# Patient Record
Sex: FEMALE | Race: WHITE
[De-identification: names, ages, dates, MRNs, and addresses within clinical notes are randomized per-mention and may not be internally consistent; named-entity substitution may affect disease eponyms.]

---

## 2017-05-09 ENCOUNTER — HOSPITAL ENCOUNTER (EMERGENCY)
Dept: HOSPITAL 63 - ER | Age: 37
Discharge: HOME | End: 2017-05-09
Payer: SELF-PAY

## 2017-05-09 VITALS
SYSTOLIC BLOOD PRESSURE: 127 MMHG | DIASTOLIC BLOOD PRESSURE: 77 MMHG | DIASTOLIC BLOOD PRESSURE: 77 MMHG | SYSTOLIC BLOOD PRESSURE: 127 MMHG | SYSTOLIC BLOOD PRESSURE: 127 MMHG | DIASTOLIC BLOOD PRESSURE: 77 MMHG

## 2017-05-09 VITALS — BODY MASS INDEX: 33.74 KG/M2 | WEIGHT: 215 LBS | HEIGHT: 67 IN

## 2017-05-09 DIAGNOSIS — E11.9: ICD-10-CM

## 2017-05-09 DIAGNOSIS — Z91.040: ICD-10-CM

## 2017-05-09 DIAGNOSIS — N93.9: ICD-10-CM

## 2017-05-09 DIAGNOSIS — R10.84: Primary | ICD-10-CM

## 2017-05-09 DIAGNOSIS — E03.9: ICD-10-CM

## 2017-05-09 LAB
% LYMPHS: 18 % (ref 24–48)
% SEGS: 81 % (ref 35–66)
ALBUMIN SERPL-MCNC: 4.1 G/DL (ref 3.4–5)
ALP SERPL-CCNC: 24 U/L (ref 46–116)
ALT SERPL-CCNC: 25 U/L (ref 14–59)
ANION GAP SERPL CALC-SCNC: 13 MMOL/L (ref 6–14)
ANISOCYTOSIS BLD QL SMEAR: SLIGHT
APTT PPP: YELLOW S
AST SERPL-CCNC: 11 U/L (ref 15–37)
BACTERIA #/AREA URNS HPF: 0 /HPF
BASOPHILS # BLD AUTO: 0.1 X10^3/UL (ref 0–0.2)
BASOPHILS NFR BLD: 1 % (ref 0–3)
BILIRUB DIRECT SERPL-MCNC: 0.1 MG/DL (ref 0–0.2)
BILIRUB SERPL-MCNC: 0.5 MG/DL (ref 0.2–1)
BILIRUB UR QL STRIP: (no result)
CA-I SERPL ISE-MCNC: 14 MG/DL (ref 7–20)
CALCIUM SERPL-MCNC: 9.4 MG/DL (ref 8.5–10.1)
CHLORIDE SERPL-SCNC: 104 MMOL/L (ref 98–107)
CO2 SERPL-SCNC: 22 MMOL/L (ref 21–32)
CREAT SERPL-MCNC: 1 MG/DL (ref 0.6–1)
EOSINOPHIL NFR BLD AUTO: 1 % (ref 0–5)
EOSINOPHIL NFR BLD: 0.1 X10^3/UL (ref 0–0.7)
EOSINOPHIL NFR BLD: 1 % (ref 0–3)
ERYTHROCYTE [DISTWIDTH] IN BLOOD BY AUTOMATED COUNT: 16.1 % (ref 11.5–14.5)
FIBRINOGEN PPP-MCNC: (no result) MG/DL
GFR SERPLBLD BASED ON 1.73 SQ M-ARVRAT: 63.5 ML/MIN
GLUCOSE SERPL-MCNC: 150 MG/DL (ref 70–99)
GLUCOSE UR STRIP-MCNC: (no result) MG/DL
HCT VFR BLD CALC: 37.5 % (ref 36–47)
HGB BLD-MCNC: 12.3 G/DL (ref 12–15.5)
HYPOCHROMIA BLD QL SMEAR: SLIGHT
LIPASE: 202 U/L (ref 73–393)
LYMPHOCYTES # BLD: 2.1 X10^3/UL (ref 1–4.8)
LYMPHOCYTES NFR BLD AUTO: 12 % (ref 24–48)
MCH RBC QN AUTO: 27 PG (ref 25–35)
MCHC RBC AUTO-ENTMCNC: 33 G/DL (ref 31–37)
MCV RBC AUTO: 81 FL (ref 79–100)
MONO #: 0.8 X10^3/UL (ref 0–1.1)
MONOCYTES NFR BLD: 5 % (ref 0–9)
NEUT #: 13.9 X10^3UL (ref 1.8–7.7)
NEUTROPHILS NFR BLD AUTO: 81 % (ref 31–73)
NITRITE UR QL STRIP: (no result)
OVALOCYTES BLD QL SMEAR: (no result)
PLATELET # BLD AUTO: 286 X10^3/UL (ref 140–400)
PLATELET # BLD EST: ADEQUATE 10*3/UL
POLYCHROMASIA BLD QL SMEAR: SLIGHT
POTASSIUM SERPL-SCNC: 3.7 MMOL/L (ref 3.5–5.1)
PREG TEST PT QUAL: NEGATIVE
PROT SERPL-MCNC: 7.8 G/DL (ref 6.4–8.2)
RBC # BLD AUTO: 4.62 X10^6/UL (ref 3.5–5.4)
RBC #/AREA URNS HPF: (no result) /HPF (ref 0–2)
SODIUM SERPL-SCNC: 139 MMOL/L (ref 136–145)
SP GR UR STRIP: <=1.005
SQUAMOUS #/AREA URNS LPF: (no result) /LPF
UROBILINOGEN UR-MCNC: 0.2 MG/DL
WBC # BLD AUTO: 17.1 X10^3/UL (ref 4–11)
WBC #/AREA URNS HPF: (no result) /HPF (ref 0–4)

## 2017-05-09 PROCEDURE — 87591 N.GONORRHOEAE DNA AMP PROB: CPT

## 2017-05-09 PROCEDURE — 87491 CHLMYD TRACH DNA AMP PROBE: CPT

## 2017-05-09 PROCEDURE — 96374 THER/PROPH/DIAG INJ IV PUSH: CPT

## 2017-05-09 PROCEDURE — 74177 CT ABD & PELVIS W/CONTRAST: CPT

## 2017-05-09 PROCEDURE — 36415 COLL VENOUS BLD VENIPUNCTURE: CPT

## 2017-05-09 PROCEDURE — 74022 RADEX COMPL AQT ABD SERIES: CPT

## 2017-05-09 PROCEDURE — 80076 HEPATIC FUNCTION PANEL: CPT

## 2017-05-09 PROCEDURE — 85007 BL SMEAR W/DIFF WBC COUNT: CPT

## 2017-05-09 PROCEDURE — 99285 EMERGENCY DEPT VISIT HI MDM: CPT

## 2017-05-09 PROCEDURE — 96375 TX/PRO/DX INJ NEW DRUG ADDON: CPT

## 2017-05-09 PROCEDURE — 81001 URINALYSIS AUTO W/SCOPE: CPT

## 2017-05-09 PROCEDURE — 87086 URINE CULTURE/COLONY COUNT: CPT

## 2017-05-09 PROCEDURE — 96361 HYDRATE IV INFUSION ADD-ON: CPT

## 2017-05-09 PROCEDURE — 84703 CHORIONIC GONADOTROPIN ASSAY: CPT

## 2017-05-09 PROCEDURE — 80048 BASIC METABOLIC PNL TOTAL CA: CPT

## 2017-05-09 PROCEDURE — 83690 ASSAY OF LIPASE: CPT

## 2017-05-09 PROCEDURE — 85027 COMPLETE CBC AUTOMATED: CPT

## 2017-05-09 NOTE — RAD
Indication: Abdominal pain and nausea and vomiting.



Time of exam 1427 hours.



The heart size is normal. Lungs are clear. No free air is identified. The

bowel gas pattern is nonobstructed. No pathological calcifications are seen.

There is an IUD in the midline of the pelvis.



Impression: No acute feature detected.

## 2017-05-09 NOTE — PHYS DOC
Past History


Past Medical History:  Diabetes, Hypothyroid


Past Surgical History:  Other


Alcohol Use:  None


Drug Use:  None





Adult General


Chief Complaint


Chief Complaint:  ABDOMINAL PAIN





HPI


HPI


36-year-old female presenting to the emergency department with generalized 

abdominal pain for the past 24-48 hours. Her abdominal pain is been present off 

and on for a few months. She reports having history of her string breaking off 

her IUD after they were trying to remove it at a local clinic. Her pain is been 

worsening over the past 5 days. It is a non-focal generalized mild to moderate 

intermittent pain. Nonradiating. She denies being pregnant.





Review of systems is negative for chest pain shortness of breath nausea 

vomiting fevers or chills. She has had mild vaginal bleeding but without 

changes in her vaginal discharge or recent STI exposure. All other review of 

systems is negative unless otherwise noted in history of present illness.





Review of Systems


Review of Systems


SEE ABOVE.





Allergies


Allergies





Allergies








Coded Allergies Type Severity Reaction Last Updated Verified


 


  latex Allergy Unknown rash 5/17/15 No











Physical Exam


Physical Exam





Constitutional: Well developed, well nourished, no acute distress, non-toxic 

appearance. 


HENT: Normocephalic, atraumatic, bilateral external ears normal, oropharynx 

moist, no oral exudates, nose normal. []


Eyes: PERRLA, EOMI, conjunctiva normal, no discharge.  


Neck: Normal range of motion, no tenderness, supple, no stridor. [] 


Cardiovascular:Heart rate regular rhythm, no murmur 


Lungs & Thorax:  Bilateral breath sounds clear to auscultation []


Abdomen: Bowel sounds normal, soft, no tenderness, no masses, no pulsatile 

masses.  


Vaginal exam performed in the presence of a female nurse shows IUD in place 

with strings exiting through the cervical os. No erythema of the cervix. No 

cervical motion tenderness present. No fluctuant masses.


Skin: Warm, dry, no erythema, no rash. [] 


Back: No tenderness, no CVA tenderness. [] 


Extremities: No tenderness, no cyanosis, no clubbing, ROM intact, no edema.  


Neurologic: Alert and oriented X 3, normal motor function, normal sensory 

function, no focal deficits noted. []


Psychologic: Affect normal, judgement normal, mood normal. []





Current Patient Data


Lab Results





 Laboratory Tests








Test


  5/9/17


14:07


 


White Blood Count


  17.1 x10^3/uL


(4.0-11.0)  H


 


Red Blood Count


  4.62 x10^6/uL


(3.50-5.40)


 


Hemoglobin


  12.3 g/dL


(12.0-15.5)


 


Hematocrit


  37.5 %


(36.0-47.0)


 


Mean Corpuscular Volume


  81 fL ()


 


 


Mean Corpuscular Hemoglobin 27 pg (25-35)  


 


Mean Corpuscular Hemoglobin


Concent 33 g/dL


(31-37)


 


Red Cell Distribution Width


  16.1 %


(11.5-14.5)  H


 


Platelet Count


  286 x10^3/uL


(140-400)


 


Neutrophils (%) (Auto) 81 % (31-73)  H


 


Lymphocytes (%) (Auto) 12 % (24-48)  L


 


Monocytes (%) (Auto) 5 % (0-9)  


 


Eosinophils (%) (Auto) 1 % (0-3)  


 


Basophils (%) (Auto) 1 % (0-3)  


 


Neutrophils # (Auto)


  13.9 x10^3uL


(1.8-7.7)  H


 


Lymphocytes # (Auto)


  2.1 x10^3/uL


(1.0-4.8)


 


Monocytes # (Auto)


  0.8 x10^3/uL


(0.0-1.1)


 


Eosinophils # (Auto)


  0.1 x10^3/uL


(0.0-0.7)


 


Basophils # (Auto)


  0.1 x10^3/uL


(0.0-0.2)


 


Segmented Neutrophils % 81 % (35-66)  H


 


Lymphocytes % 18 % (24-48)  L


 


Eosinophils % 1 % (0-5)  


 


Platelet Estimate


  Adequate


(ADEQUATE)


 


Polychromasia Slight  


 


Hypochromasia Slight  


 


Anisocytosis Slight  


 


Ovalocytes Occ  


 


Urine Collection Type Unknown  


 


Urine Color Yellow  


 


Urine Clarity Hazy  


 


Urine pH 6.0  


 


Urine Specific Gravity <=1.005  


 


Urine Protein


  Neg


(NEG-TRACE)


 


Urine Glucose (UA)


  Neg mg/dL


(NEG)


 


Urine Ketones (Stick)


  15 mg/dL (NEG)


 


 


Urine Blood Small (NEG)  


 


Urine Nitrite Neg (NEG)  


 


Urine Bilirubin Neg (NEG)  


 


Urine Urobilinogen Dipstick


  0.2 mg/dL (0.2


mg/dL)


 


Urine Leukocyte Esterase Neg (NEG)  


 


Urine RBC


  Occ /HPF (0-2)


 


 


Urine WBC


  5-10 /HPF


(0-4)


 


Urine Squamous Epithelial


Cells Few /LPF  


 


 


Urine Bacteria


  0 /HPF (0-FEW)


 


 


Sodium Level


  139 mmol/L


(136-145)


 


Potassium Level


  3.7 mmol/L


(3.5-5.1)


 


Chloride Level


  104 mmol/L


()


 


Carbon Dioxide Level


  22 mmol/L


(21-32)


 


Anion Gap 13 (6-14)  


 


Blood Urea Nitrogen


  14 mg/dL


(7-20)


 


Creatinine


  1.0 mg/dL


(0.6-1.0)


 


Estimated GFR


(Cockcroft-Gault) 63.5  


 


 


Glucose Level


  150 mg/dL


(70-99)  H


 


Calcium Level


  9.4 mg/dL


(8.5-10.1)


 


Total Bilirubin


  0.5 mg/dL


(0.2-1.0)


 


Direct Bilirubin


  0.1 mg/dL


(0.0-0.2)


 


Aspartate Amino Transferase


(AST) 11 U/L (15-37)


L


 


Alanine Aminotransferase (ALT)


  25 U/L (14-59)


 


 


Alkaline Phosphatase


  24 U/L


()  L


 


Total Protein


  7.8 g/dL


(6.4-8.2)


 


Albumin


  4.1 g/dL


(3.4-5.0)


 


Lipase


  202 U/L


()


 


Serum Pregnancy Test,


Qualitative Negative (NEG)


 











EKG


EKG


[]





Radiology/Procedures


Radiology/Procedures


[]





Course & Med Decision Making


Course & Med Decision Making


Pertinent Labs and Imaging studies reviewed. (See chart for details)





[] 36-year-old female presenting the emergency department with abdominal pain. 

Vital signs showed that she was mildly tachycardic in 116. Pertinent physical 

exam showed a soft nontender abdomen. Nontender appendix. Nontender 

gallbladder. Vaginal exam was unremarkable. X-rays were unremarkable. Labs 

showed a leukocytosis urine analysis not suggestive of infection. Otherwise 

chemistry panel unremarkable. CT of the abdomen pelvis neg for acute pathology. 

On reexamination she was feeling much better. The patient was then discharged 

home in stable condition to follow up with their primary care physician over 

the next 2-3 days. They were to return if their symptoms worsened or if they 

were concerned for any reason. Face-to-face discharge instructions and return 

precautions were given. Patient's questions were answered to their 

satisfaction. Patient is comfortable plan.





Dragon Disclaimer


Dragon Disclaimer


This chart was dictated in whole or in part using Voice Recognition software in 

a busy, high-work load, and often noisy Emergency Department environment.  It 

may contain unintended and wholly unrecognized errors or omissions.





Departure


Departure:


Impression:  


 Primary Impression:  


 Abdominal pain


Disposition:  01 HOME, SELF-CARE


Condition:  STABLE


Referrals:  


FRACISCO DAVIS (PCP)


Patient Instructions:  Abdominal Pain





Additional Instructions:  


Thank you for allowing us to participate in your care today.





Followup with your primary care physician in 3 days if your symptoms do not 

improve. If you do not have a primary care provider you can ask for a list of 

our primary care providers. Return to the emergency department you have any new 

or concerning findings.





This should be evaluated by the primary care physician and any necessary 

consulting services for continued management within a few days after discharge. 

Return to emergency room if you have any  new or concerning symptoms including 

but not limited to fever, chills, nausea, vomiting, intractable pain, any new 

rashes, chest pain, shortness of air, uncontrolled bleeding, difficulty 

breathing, and/or vision loss.





You may have been prescribed medication that can change in your level of 

thinking and ability to operate machinery. These medications include 

hydrocodone and Ativan. Also, Benadryl has been known to do this as well. Be 

sure to check with your pharmacist and ask if the medications you've prescribed 

can affect your level of consciousness. I recommend not operating heavy 

machinery or driving while on medication such as these.


Scripts


Morphine Sulfate (MORPHINE SULFATE) 15 Mg Tablet


1 TAB PO PRN Q8HRS Y for SEVERE PAIN, #4 TAB


   Be careful as this medication may make you sleepy or


   drowsy. Do not drive or operate heavy machinery on this


   medication. Be sure to ask the pharmacist about other side


   effects that can exist such as constipation.


   Prov: SUSAN STEVEN MD         5/9/17











SUSAN STEVEN MD May 9, 2017 15:27

## 2017-05-09 NOTE — RAD
Exam performed: CT abdomen pelvis with contrast.



History: Patient's doctor tried removed a IUD last week but this faint group

of. Patient is now complaining of lower abdominal pain, nausea vomiting and

bleeding.



Date of service: 05/09/17. Comparison: CT abdomen pelvis from 05/17/15.



Technique: Contiguous helical acquisitions are obtained through the abdomen

and pelvis during intravenous administration of 75 cc of Omnipaque 300.

Sagittal and coronal reformatted images are obtained and reviewed.



Findings:



The uterus is anteverted. There is a IUD in place which is situated somewhat

inferiorly in the endometrial cavity and endocervical region as compared to

the prior exam. There is small amount of fluid in the endometrial cavity.



The lung bases are essentially clear. The visualized heart is normal.



The liver, spleen, pancreas and gallbladder appear normal. Bilateral adrenal

glands and both kidneys appear normal in size with symmetric excretion of

contrast via both kidneys. Bilateral parapelvic cysts. There is also a tiny

subcentimeter left midpole cortical cyst. No nephrolithiasis or

hydronephrosis. Aorta is normal in caliber without aneurysm. Small and large

bowel loops are nondilated and unremarkable. Appendix is normal



Urinary bladder is partially decompressed. There is a probable left ovarian

cyst. No free or focal fluid collections or pelvic lymphadenopathy seen.



Impression:



IUD is situated somewhat inferiorly as compared to the previous study. No

evidence of perforations through the myometrial wall is noted.

Appendix is normal.







PQRS Compliance Statement:



One or more of the following individualized dose reduction techniques were

utilized for this examination:

1. Automated exposure control

2. Adjustment of the mA and/or kV according to patient size

3. Use of iterative reconstruction technique

## 2017-05-11 ENCOUNTER — HOSPITAL ENCOUNTER (OUTPATIENT)
Dept: HOSPITAL 63 - US | Age: 37
Discharge: HOME | End: 2017-05-11
Payer: COMMERCIAL

## 2017-05-11 DIAGNOSIS — M54.2: ICD-10-CM

## 2017-05-11 DIAGNOSIS — R10.2: Primary | ICD-10-CM

## 2017-05-11 PROCEDURE — 76856 US EXAM PELVIC COMPLETE: CPT

## 2017-05-11 PROCEDURE — 76830 TRANSVAGINAL US NON-OB: CPT

## 2017-05-11 NOTE — RAD
Indication: Pelvic pain and spotting.



Transabdominal and transvaginal pelvic sonography was performed. The uterus

measures 8.4 x 5.6 x 4.4 cm. There is an IUD which is in a low position. The

IUD appears to be in the lower uterine segment and upper cervix. No myometrial

mass is detected. The right ovary measures 3.2 x 2.7 x 1.4 cm and the left

ovary measures 2.8 x 2.8 x 1.8 cm. No adnexal mass or free fluid is detected.



Impression: IUD is in a low position in the lower uterine segment and cervix.

No other significant abnormality is detected.

## 2020-03-14 ENCOUNTER — HOSPITAL ENCOUNTER (EMERGENCY)
Dept: HOSPITAL 63 - ER | Age: 40
Discharge: HOME | End: 2020-03-14
Payer: SELF-PAY

## 2020-03-14 DIAGNOSIS — T36.8X5A: ICD-10-CM

## 2020-03-14 DIAGNOSIS — R11.2: Primary | ICD-10-CM
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
## 2020-03-14 LAB
ALBUMIN SERPL-MCNC: 3.3 G/DL (ref 3.4–5)
ALP SERPL-CCNC: 81 U/L (ref 46–116)
ALT SERPL-CCNC: 134 U/L (ref 14–59)
ANION GAP SERPL CALC-SCNC: 16 MMOL/L (ref 6–14)
AST SERPL-CCNC: 117 U/L (ref 15–37)
BASOPHILS # BLD AUTO: 0 X10^3/UL (ref 0–0.2)
BASOPHILS NFR BLD: 1 % (ref 0–3)
BILIRUB DIRECT SERPL-MCNC: 0.6 MG/DL (ref 0–0.2)
BILIRUB SERPL-MCNC: 0.7 MG/DL (ref 0.2–1)
CA-I SERPL ISE-MCNC: 16 MG/DL (ref 7–20)
CALCIUM SERPL-MCNC: 8.5 MG/DL (ref 8.5–10.1)
CHLORIDE SERPL-SCNC: 103 MMOL/L (ref 98–107)
CO2 SERPL-SCNC: 20 MMOL/L (ref 21–32)
CREAT SERPL-MCNC: 1 MG/DL (ref 0.6–1)
EOSINOPHIL NFR BLD: 0 X10^3/UL (ref 0–0.7)
EOSINOPHIL NFR BLD: 1 % (ref 0–3)
ERYTHROCYTE [DISTWIDTH] IN BLOOD BY AUTOMATED COUNT: 16.5 % (ref 11.5–14.5)
GFR SERPLBLD BASED ON 1.73 SQ M-ARVRAT: 62.4 ML/MIN
GLUCOSE SERPL-MCNC: 192 MG/DL (ref 70–99)
HCT VFR BLD CALC: 39.3 % (ref 36–47)
HGB BLD-MCNC: 12.9 G/DL (ref 12–15.5)
LYMPHOCYTES # BLD: 0.2 X10^3/UL (ref 1–4.8)
LYMPHOCYTES NFR BLD AUTO: 5 % (ref 24–48)
MCH RBC QN AUTO: 27 PG (ref 25–35)
MCHC RBC AUTO-ENTMCNC: 33 G/DL (ref 31–37)
MCV RBC AUTO: 83 FL (ref 79–100)
MONO #: 0.1 X10^3/UL (ref 0–1.1)
MONOCYTES NFR BLD: 4 % (ref 0–9)
NEUT #: 3.1 X10^3UL (ref 1.8–7.7)
NEUTROPHILS NFR BLD AUTO: 89 % (ref 31–73)
PLATELET # BLD AUTO: 159 X10^3/UL (ref 140–400)
POTASSIUM SERPL-SCNC: 4.2 MMOL/L (ref 3.5–5.1)
PROT SERPL-MCNC: 6.4 G/DL (ref 6.4–8.2)
RBC # BLD AUTO: 4.75 X10^6/UL (ref 3.5–5.4)
SODIUM SERPL-SCNC: 139 MMOL/L (ref 136–145)
U PREG PATIENT: NEGATIVE
WBC # BLD AUTO: 3.5 X10^3/UL (ref 4–11)

## 2020-03-14 PROCEDURE — 99285 EMERGENCY DEPT VISIT HI MDM: CPT

## 2020-03-14 PROCEDURE — 81025 URINE PREGNANCY TEST: CPT

## 2020-03-14 PROCEDURE — 93005 ELECTROCARDIOGRAM TRACING: CPT

## 2020-03-14 PROCEDURE — 84484 ASSAY OF TROPONIN QUANT: CPT

## 2020-03-14 PROCEDURE — 80048 BASIC METABOLIC PNL TOTAL CA: CPT

## 2020-03-14 PROCEDURE — 36415 COLL VENOUS BLD VENIPUNCTURE: CPT

## 2020-03-14 PROCEDURE — 80076 HEPATIC FUNCTION PANEL: CPT

## 2020-03-14 PROCEDURE — 85025 COMPLETE CBC W/AUTO DIFF WBC: CPT

## 2020-03-14 PROCEDURE — 71045 X-RAY EXAM CHEST 1 VIEW: CPT

## 2020-03-14 NOTE — RAD
EXAM: Chest, single view.

 

HISTORY: Shortness of breath.

 

COMPARISON: None.

 

FINDINGS: A single view of the chest is obtained. There is no infiltrate, 

pleural effusion or pneumothorax. The heart is normal in size.

 

IMPRESSION: No acute pulmonary finding.

 

Electronically signed by: Pretty Noble MD (3/14/2020 4:09 PM) [facility] [place]

## 2020-03-16 NOTE — EKG
[facility] [address], [place] [address]

Test Date:    2020               Test Time:    02:37:11

Pat Name:     BALJIT RODRÍGUEZ           Department:   

Patient ID:   [id number]           Room:          

Gender:       F                        Technician:   

:          [id number]               Requested By: TSERING ACEVES

Order Number: [id number]            Reading MD:     

                                 Measurements

Intervals                              Axis          

Rate:         94                       P:            40

ID:           [id number]                      QRS:          19

QRSD:         86                       T:            28

QT:           324                                    

QTc:          405                                    

                           Interpretive Statements

SINUS RHYTHM

QRS(T) CONTOUR ABNORMALITY

CONSIDER ANTEROLATERAL MYOCARDIAL DAMAGE

POSSIBLY ABNORMAL ECG

RI6.01

No previous ECG available for comparison